# Patient Record
Sex: MALE | Race: WHITE
[De-identification: names, ages, dates, MRNs, and addresses within clinical notes are randomized per-mention and may not be internally consistent; named-entity substitution may affect disease eponyms.]

---

## 2020-08-24 ENCOUNTER — ERX REFILL RESPONSE (OUTPATIENT)
Age: 70
End: 2020-08-24

## 2020-08-24 RX ORDER — PANTOPRAZOLE 40 MG/1
TAKE 1 TABLET DAILY TABLET, DELAYED RELEASE ORAL
Qty: 90 | Refills: 0

## 2020-10-30 ENCOUNTER — OFFICE VISIT (OUTPATIENT)
Dept: URBAN - METROPOLITAN AREA TELEHEALTH 2 | Facility: TELEHEALTH | Age: 70
End: 2020-10-30

## 2020-11-06 ENCOUNTER — OFFICE VISIT (OUTPATIENT)
Dept: URBAN - METROPOLITAN AREA TELEHEALTH 2 | Facility: TELEHEALTH | Age: 70
End: 2020-11-06
Payer: COMMERCIAL

## 2020-11-06 DIAGNOSIS — K27.9 PUD (PEPTIC ULCER DISEASE): ICD-10-CM

## 2020-11-06 DIAGNOSIS — K44.9 HIATAL HERNIA: ICD-10-CM

## 2020-11-06 DIAGNOSIS — D12.6 ADENOMATOUS POLYP OF COLON, UNSPECIFIED PART OF COLON: ICD-10-CM

## 2020-11-06 PROBLEM — 13200003: Status: ACTIVE | Noted: 2020-11-06

## 2020-11-06 PROCEDURE — 4004F PT TOBACCO SCREEN RCVD TLK: CPT | Performed by: INTERNAL MEDICINE

## 2020-11-06 PROCEDURE — 3017F COLORECTAL CA SCREEN DOC REV: CPT | Performed by: INTERNAL MEDICINE

## 2020-11-06 PROCEDURE — 99213 OFFICE O/P EST LOW 20 MIN: CPT | Performed by: INTERNAL MEDICINE

## 2020-11-06 PROCEDURE — G8482 FLU IMMUNIZE ORDER/ADMIN: HCPCS | Performed by: INTERNAL MEDICINE

## 2020-11-06 PROCEDURE — G8417 CALC BMI ABV UP PARAM F/U: HCPCS | Performed by: INTERNAL MEDICINE

## 2020-11-06 PROCEDURE — G8427 DOCREV CUR MEDS BY ELIG CLIN: HCPCS | Performed by: INTERNAL MEDICINE

## 2020-11-06 RX ORDER — PANTOPRAZOLE 40 MG/1
TAKE 1 TABLET DAILY TABLET, DELAYED RELEASE ORAL
Qty: 90 | Refills: 0 | Status: ACTIVE | COMMUNITY

## 2020-11-06 RX ORDER — ALLOPURINOL 300 MG/1
1 TABLET TABLET ORAL ONCE A DAY
Status: ACTIVE | COMMUNITY

## 2020-11-06 RX ORDER — PANTOPRAZOLE 40 MG/1
1 TABLET TABLET, DELAYED RELEASE ORAL ONCE A DAY
Qty: 90 | Refills: 3

## 2021-03-24 ENCOUNTER — TELEPHONE ENCOUNTER (OUTPATIENT)
Dept: URBAN - METROPOLITAN AREA CLINIC 23 | Facility: CLINIC | Age: 71
End: 2021-03-24

## 2021-03-24 RX ORDER — PANTOPRAZOLE SODIUM 40 MG/1
1 TABLET TABLET, DELAYED RELEASE ORAL ONCE A DAY
Qty: 90 TABLET | Refills: 2 | OUTPATIENT

## 2021-05-10 ENCOUNTER — WEB ENCOUNTER (OUTPATIENT)
Dept: URBAN - METROPOLITAN AREA CLINIC 96 | Facility: CLINIC | Age: 71
End: 2021-05-10

## 2021-05-10 ENCOUNTER — OFFICE VISIT (OUTPATIENT)
Dept: URBAN - METROPOLITAN AREA CLINIC 96 | Facility: CLINIC | Age: 71
End: 2021-05-10
Payer: COMMERCIAL

## 2021-05-10 VITALS
DIASTOLIC BLOOD PRESSURE: 79 MMHG | WEIGHT: 175 LBS | HEART RATE: 75 BPM | BODY MASS INDEX: 26.52 KG/M2 | SYSTOLIC BLOOD PRESSURE: 151 MMHG | TEMPERATURE: 98.1 F | HEIGHT: 68 IN

## 2021-05-10 DIAGNOSIS — K21.9 GASTROESOPHAGEAL REFLUX DISEASE, UNSPECIFIED WHETHER ESOPHAGITIS PRESENT: ICD-10-CM

## 2021-05-10 DIAGNOSIS — K63.5 HYPERPLASTIC POLYP OF SIGMOID COLON: ICD-10-CM

## 2021-05-10 PROCEDURE — 99213 OFFICE O/P EST LOW 20 MIN: CPT | Performed by: INTERNAL MEDICINE

## 2021-05-10 RX ORDER — ALLOPURINOL 300 MG/1
1 TABLET TABLET ORAL ONCE A DAY
Status: ACTIVE | COMMUNITY

## 2021-05-10 RX ORDER — PANTOPRAZOLE SODIUM 40 MG/1
1 TABLET TABLET, DELAYED RELEASE ORAL ONCE A DAY
Qty: 90 TABLET | Refills: 2 | Status: DISCONTINUED | COMMUNITY

## 2021-05-10 RX ORDER — PANTOPRAZOLE 40 MG/1
1 TABLET TABLET, DELAYED RELEASE ORAL ONCE A DAY
Qty: 90 | Refills: 3 | Status: ACTIVE | COMMUNITY

## 2021-05-10 NOTE — HPI-TODAY'S VISIT:
71 y.o. WM Seen 1.5 + yrs ago PCP - [facility] / [facility] Wonders if due for scopes Really doing pretty well Reflux pretty well controlled w/ Pantoprazole 40mg No dysphagia No N/V Bowels are usual - no blood - loose Lots of gas

## 2021-12-17 ENCOUNTER — TELEPHONE ENCOUNTER (OUTPATIENT)
Dept: URBAN - METROPOLITAN AREA CLINIC 96 | Facility: CLINIC | Age: 71
End: 2021-12-17

## 2021-12-17 RX ORDER — PANTOPRAZOLE 40 MG/1
1 TABLET TABLET, DELAYED RELEASE ORAL ONCE A DAY
Qty: 90 | Refills: 1

## 2022-06-15 ENCOUNTER — TELEPHONE ENCOUNTER (OUTPATIENT)
Dept: URBAN - METROPOLITAN AREA CLINIC 96 | Facility: CLINIC | Age: 72
End: 2022-06-15

## 2022-06-15 RX ORDER — PANTOPRAZOLE 40 MG/1
1 TABLET TABLET, DELAYED RELEASE ORAL ONCE A DAY
Qty: 90 | Refills: 0

## 2022-08-03 ENCOUNTER — OFFICE VISIT (OUTPATIENT)
Dept: URBAN - METROPOLITAN AREA TELEHEALTH 2 | Facility: TELEHEALTH | Age: 72
End: 2022-08-03
Payer: COMMERCIAL

## 2022-08-03 DIAGNOSIS — K21.9 GASTRO-ESOPHAGEAL REFLUX DISEASE WITHOUT ESOPHAGITIS: ICD-10-CM

## 2022-08-03 DIAGNOSIS — K44.9 DIAPHRAGMATIC HERNIA WITHOUT OBSTRUCTION OR GANGRENE: ICD-10-CM

## 2022-08-03 PROBLEM — 266435005: Status: ACTIVE | Noted: 2022-08-03

## 2022-08-03 PROCEDURE — 99213 OFFICE O/P EST LOW 20 MIN: CPT | Performed by: INTERNAL MEDICINE

## 2022-08-03 RX ORDER — PANTOPRAZOLE SODIUM 40 MG/1
1 TABLET TABLET, DELAYED RELEASE ORAL ONCE A DAY
Qty: 90 TABLET | Refills: 3 | OUTPATIENT

## 2022-08-03 NOTE — HPI-TODAY'S VISIT:
72 y.o. WM Seen 1+ yr ago - May 2021 Very little has changed from 5/21 Reflux 15% better from last yr - doesn't really plague him Tried to pull back from Pantoprazole and taper off and couldn't do that Needs a refill of it No dysphagia No N/V Feels like epiglottis doesn't seal wind pipe as well as used to - inhaling drinks more often UTD w/ physical - had bunch of cardiac testing - all negative

## 2022-09-01 ENCOUNTER — WEB ENCOUNTER (OUTPATIENT)
Dept: URBAN - METROPOLITAN AREA CLINIC 50 | Facility: CLINIC | Age: 72
End: 2022-09-01

## 2022-09-01 RX ORDER — PANTOPRAZOLE SODIUM 40 MG/1
1 TABLET TABLET, DELAYED RELEASE ORAL ONCE A DAY
Qty: 90 TABLET | Refills: 3 | Status: ACTIVE | COMMUNITY

## 2022-09-01 RX ORDER — SODIUM, POTASSIUM,MAG SULFATES 17.5-3.13G
177 ML SOLUTION, RECONSTITUTED, ORAL ORAL AS DIRECTED
Qty: 1 BOX | Refills: 0 | OUTPATIENT
Start: 2022-09-01 | End: 2022-09-02

## 2022-09-16 PROBLEM — 59614000: Status: ACTIVE | Noted: 2022-09-16

## 2022-10-28 ENCOUNTER — CLAIMS CREATED FROM THE CLAIM WINDOW (OUTPATIENT)
Dept: URBAN - METROPOLITAN AREA CLINIC 4 | Facility: CLINIC | Age: 72
End: 2022-10-28
Payer: COMMERCIAL

## 2022-10-28 ENCOUNTER — OFFICE VISIT (OUTPATIENT)
Dept: URBAN - METROPOLITAN AREA SURGERY CENTER 18 | Facility: SURGERY CENTER | Age: 72
End: 2022-10-28
Payer: COMMERCIAL

## 2022-10-28 DIAGNOSIS — D12.5 ADENOMA OF SIGMOID COLON: ICD-10-CM

## 2022-10-28 DIAGNOSIS — D12.5 BENIGN NEOPLASM OF SIGMOID COLON: ICD-10-CM

## 2022-10-28 DIAGNOSIS — R19.5 ABNORMAL CONSISTENCY OF STOOL: ICD-10-CM

## 2022-10-28 PROCEDURE — 45385 COLONOSCOPY W/LESION REMOVAL: CPT | Performed by: INTERNAL MEDICINE

## 2022-10-28 PROCEDURE — 88305 TISSUE EXAM BY PATHOLOGIST: CPT | Performed by: PATHOLOGY

## 2022-10-28 PROCEDURE — G8907 PT DOC NO EVENTS ON DISCHARG: HCPCS | Performed by: INTERNAL MEDICINE

## 2022-10-28 RX ORDER — PANTOPRAZOLE SODIUM 40 MG/1
1 TABLET TABLET, DELAYED RELEASE ORAL ONCE A DAY
Qty: 90 TABLET | Refills: 3 | Status: ACTIVE | COMMUNITY

## 2022-11-01 ENCOUNTER — TELEPHONE ENCOUNTER (OUTPATIENT)
Dept: URBAN - METROPOLITAN AREA CLINIC 96 | Facility: CLINIC | Age: 72
End: 2022-11-01

## 2022-11-02 ENCOUNTER — WEB ENCOUNTER (OUTPATIENT)
Dept: URBAN - METROPOLITAN AREA CLINIC 92 | Facility: CLINIC | Age: 72
End: 2022-11-02

## 2022-11-02 ENCOUNTER — LAB OUTSIDE AN ENCOUNTER (OUTPATIENT)
Dept: URBAN - METROPOLITAN AREA CLINIC 96 | Facility: CLINIC | Age: 72
End: 2022-11-02

## 2022-11-02 VITALS — HEIGHT: 68 IN

## 2022-11-04 LAB
ABSOLUTE BASOPHIL COUNT: 0.03
ABSOLUTE EOSINOPHIL COUNT: 0.04
ABSOLUTE IMMATURE GRANULOCYTE  COUNT: 0.02
ABSOLUTE LYMPHOCYTE COUNT: 1.59
ABSOLUTE MONOCYTE COUNT: 0.48
ABSOLUTE NEUTROPHIL COUNT (ANC): 4.07
ABSOLUTE NRBC  COUNT: 0
AG RATIO: 1
ALBUMIN LEVEL: 4
ALK PHOS: 107
ALT: 20
ANION GAP: 5
AST: 28
BASOPHIL AUTO: 0
BILIRUBIN TOTAL: 0.7
BUN/CREAT RATIO: 10
BUN: 12
CALCIUM LEVEL: 8.8
CHLORIDE LEVEL: 105
CO2 LEVEL: 27
CREATININE LEVEL: 1.2
EOS AUTO: 1
GFR 2021: >60
GLUCOSE LEVEL: 105
HCT: 40.8
HGB: 14.5
IMMATURE GRANULOCYTES AUTO: 0.3
LYMPH AUTO: 26
MCH: 35.3
MCHC: 35.5
MCV: 99.3
MONO AUTO: 8
MPV: 11
NEUTRO AUTO: 65
NRBC AUTO: 0
OSMO (CALC): 274
PERFORMING LAB: (no result)
PLATELETS: 182
POTASSIUM LEVEL: 3.9
PROTEIN TOTAL: 7.2
RBC: 4.11
RDW: 12.9
SODIUM LEVEL: 137
WBC: 6.2

## 2022-11-16 ENCOUNTER — DASHBOARD ENCOUNTERS (OUTPATIENT)
Age: 72
End: 2022-11-16

## 2025-07-31 ENCOUNTER — LAB OUTSIDE AN ENCOUNTER (OUTPATIENT)
Dept: URBAN - METROPOLITAN AREA CLINIC 50 | Facility: CLINIC | Age: 75
End: 2025-07-31

## 2025-07-31 ENCOUNTER — OFFICE VISIT (OUTPATIENT)
Dept: URBAN - METROPOLITAN AREA CLINIC 50 | Facility: CLINIC | Age: 75
End: 2025-07-31

## 2025-07-31 NOTE — HPI-TODAY'S VISIT:
75 y.o. WM Here to discuss routine c-scope Wonders about endoscopy Lots of acid reflux stuff going on Quit taking Pantoprazole b/c kidney blowing up - now using Famotidine + Tums as needed - current regimen tends to help - not taking it regularly - reflux syx really worsened w/ stopping PPI Bowels happening - frequent loose - nothing new - rare blood w/ wiping / hemorrhoids